# Patient Record
Sex: FEMALE | Race: WHITE | NOT HISPANIC OR LATINO | URBAN - METROPOLITAN AREA
[De-identification: names, ages, dates, MRNs, and addresses within clinical notes are randomized per-mention and may not be internally consistent; named-entity substitution may affect disease eponyms.]

---
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
## 2023-02-01 ENCOUNTER — EMERGENCY (EMERGENCY)
Facility: HOSPITAL | Age: 18
LOS: 1 days | Discharge: HOME | End: 2023-02-01
Admitting: EMERGENCY MEDICINE
Payer: COMMERCIAL

## 2023-02-01 VITALS
DIASTOLIC BLOOD PRESSURE: 86 MMHG | OXYGEN SATURATION: 99 % | SYSTOLIC BLOOD PRESSURE: 130 MMHG | RESPIRATION RATE: 16 BRPM | TEMPERATURE: 99 F | HEART RATE: 109 BPM

## 2023-02-01 VITALS
SYSTOLIC BLOOD PRESSURE: 152 MMHG | HEIGHT: 65 IN | DIASTOLIC BLOOD PRESSURE: 96 MMHG | RESPIRATION RATE: 16 BRPM | TEMPERATURE: 98 F | HEART RATE: 128 BPM | OXYGEN SATURATION: 95 % | WEIGHT: 136.69 LBS

## 2023-02-01 DIAGNOSIS — R07.89 OTHER CHEST PAIN: ICD-10-CM

## 2023-02-01 DIAGNOSIS — Z88.8 ALLERGY STATUS TO OTHER DRUGS, MEDICAMENTS AND BIOLOGICAL SUBSTANCES: ICD-10-CM

## 2023-02-01 DIAGNOSIS — F41.9 ANXIETY DISORDER, UNSPECIFIED: ICD-10-CM

## 2023-02-01 DIAGNOSIS — F14.90 COCAINE USE, UNSPECIFIED, UNCOMPLICATED: ICD-10-CM

## 2023-02-01 DIAGNOSIS — F32.A DEPRESSION, UNSPECIFIED: ICD-10-CM

## 2023-02-01 DIAGNOSIS — R00.2 PALPITATIONS: ICD-10-CM

## 2023-02-01 LAB
ALBUMIN SERPL ELPH-MCNC: 4.5 G/DL — SIGNIFICANT CHANGE UP (ref 3.4–5)
ALP SERPL-CCNC: 66 U/L — SIGNIFICANT CHANGE UP (ref 40–120)
ALT FLD-CCNC: 22 U/L — SIGNIFICANT CHANGE UP (ref 12–42)
ANION GAP SERPL CALC-SCNC: 9 MMOL/L — SIGNIFICANT CHANGE UP (ref 9–16)
AST SERPL-CCNC: 12 U/L — LOW (ref 15–37)
BASOPHILS # BLD AUTO: 0.09 K/UL — SIGNIFICANT CHANGE UP (ref 0–0.2)
BASOPHILS NFR BLD AUTO: 0.7 % — SIGNIFICANT CHANGE UP (ref 0–2)
BILIRUB SERPL-MCNC: 0.6 MG/DL — SIGNIFICANT CHANGE UP (ref 0.2–1.2)
BUN SERPL-MCNC: 13 MG/DL — SIGNIFICANT CHANGE UP (ref 7–23)
CALCIUM SERPL-MCNC: 9.8 MG/DL — SIGNIFICANT CHANGE UP (ref 8.5–10.5)
CHLORIDE SERPL-SCNC: 102 MMOL/L — SIGNIFICANT CHANGE UP (ref 96–108)
CO2 SERPL-SCNC: 28 MMOL/L — SIGNIFICANT CHANGE UP (ref 22–31)
CREAT SERPL-MCNC: 0.88 MG/DL — SIGNIFICANT CHANGE UP (ref 0.5–1.3)
EGFR: 97 ML/MIN/1.73M2 — SIGNIFICANT CHANGE UP
EOSINOPHIL # BLD AUTO: 0.22 K/UL — SIGNIFICANT CHANGE UP (ref 0–0.5)
EOSINOPHIL NFR BLD AUTO: 1.7 % — SIGNIFICANT CHANGE UP (ref 0–6)
GLUCOSE SERPL-MCNC: 117 MG/DL — HIGH (ref 70–99)
HCT VFR BLD CALC: 39.9 % — SIGNIFICANT CHANGE UP (ref 34.5–45)
HGB BLD-MCNC: 13.2 G/DL — SIGNIFICANT CHANGE UP (ref 11.5–15.5)
IMM GRANULOCYTES NFR BLD AUTO: 0.3 % — SIGNIFICANT CHANGE UP (ref 0–0.9)
LYMPHOCYTES # BLD AUTO: 1.41 K/UL — SIGNIFICANT CHANGE UP (ref 1–3.3)
LYMPHOCYTES # BLD AUTO: 11.2 % — LOW (ref 13–44)
MAGNESIUM SERPL-MCNC: 1.9 MG/DL — SIGNIFICANT CHANGE UP (ref 1.6–2.6)
MCHC RBC-ENTMCNC: 29.2 PG — SIGNIFICANT CHANGE UP (ref 27–34)
MCHC RBC-ENTMCNC: 33.1 GM/DL — SIGNIFICANT CHANGE UP (ref 32–36)
MCV RBC AUTO: 88.3 FL — SIGNIFICANT CHANGE UP (ref 80–100)
MONOCYTES # BLD AUTO: 0.55 K/UL — SIGNIFICANT CHANGE UP (ref 0–0.9)
MONOCYTES NFR BLD AUTO: 4.4 % — SIGNIFICANT CHANGE UP (ref 2–14)
NEUTROPHILS # BLD AUTO: 10.27 K/UL — HIGH (ref 1.8–7.4)
NEUTROPHILS NFR BLD AUTO: 81.7 % — HIGH (ref 43–77)
NRBC # BLD: 0 /100 WBCS — SIGNIFICANT CHANGE UP (ref 0–0)
PLATELET # BLD AUTO: 339 K/UL — SIGNIFICANT CHANGE UP (ref 150–400)
POTASSIUM SERPL-MCNC: 4.3 MMOL/L — SIGNIFICANT CHANGE UP (ref 3.5–5.3)
POTASSIUM SERPL-SCNC: 4.3 MMOL/L — SIGNIFICANT CHANGE UP (ref 3.5–5.3)
PROT SERPL-MCNC: 8.2 G/DL — SIGNIFICANT CHANGE UP (ref 6.4–8.2)
RBC # BLD: 4.52 M/UL — SIGNIFICANT CHANGE UP (ref 3.8–5.2)
RBC # FLD: 12.2 % — SIGNIFICANT CHANGE UP (ref 10.3–14.5)
SODIUM SERPL-SCNC: 139 MMOL/L — SIGNIFICANT CHANGE UP (ref 132–145)
TROPONIN I, HIGH SENSITIVITY RESULT: 5 NG/L — SIGNIFICANT CHANGE UP
WBC # BLD: 12.58 K/UL — HIGH (ref 3.8–10.5)
WBC # FLD AUTO: 12.58 K/UL — HIGH (ref 3.8–10.5)

## 2023-02-01 PROCEDURE — 99285 EMERGENCY DEPT VISIT HI MDM: CPT

## 2023-02-01 PROCEDURE — 71045 X-RAY EXAM CHEST 1 VIEW: CPT | Mod: 26

## 2023-02-01 RX ORDER — SODIUM CHLORIDE 9 MG/ML
1000 INJECTION INTRAMUSCULAR; INTRAVENOUS; SUBCUTANEOUS ONCE
Refills: 0 | Status: COMPLETED | OUTPATIENT
Start: 2023-02-01 | End: 2023-02-01

## 2023-02-01 RX ADMIN — SODIUM CHLORIDE 1000 MILLILITER(S): 9 INJECTION INTRAMUSCULAR; INTRAVENOUS; SUBCUTANEOUS at 14:56

## 2023-02-01 NOTE — ED ADULT NURSE NOTE - OBJECTIVE STATEMENT
reports taking "unknown" substance prior to class - as per EMS she had reported cocaine use earlier this morning. Had gone to [facility] who sent pt to ED for further evaluation as pt c/o heart racing, visual hallucinations - states she sees bugs and bright lights. Reports substance use in the past but not daily. Reports headache.

## 2023-02-01 NOTE — ED ADULT TRIAGE NOTE - CHIEF COMPLAINT QUOTE
patient BIBA from clinic s/p cocaine use this morning at 9am; took 0.2g of cocaine (2 bumps); felt eyes were moving all around and heart racing; sent by urgent care

## 2023-02-01 NOTE — ED ADULT NURSE REASSESSMENT NOTE - NS ED NURSE REASSESS COMMENT FT1
pt more alert, stating she feels better and is no longer having visual hallucinations. Requesting to go home.

## 2023-02-01 NOTE — ED PROVIDER NOTE - PHYSICAL EXAMINATION
CONSTITUTIONAL: Well-appearing; well-nourished; in no apparent distress.   	HEAD: Normocephalic; atraumatic.   	EYES:  conjunctiva and sclera clear  	ENT: normal nose; no rhinorrhea; normal pharynx with no erythema or lesions.   	NECK: Supple; non-tender;   	CARDIOVASCULAR: Normal S1, S2; no murmurs, rubs, or gallops. Regular rate and rhythm.   	RESPIRATORY: Breathing easily; breath sounds clear and equal bilaterally; no wheezes, rhonchi, or rales.  	GI: Soft; non-distended; non-tender  	EXT: BOB x 4.   	SKIN: Normal for age and race; warm; dry; good turgor; no apparent lesions or rash.   	NEURO: A & O x 3; face symmetric; grossly unremarkable.   PSYCHOLOGICAL: The patient’s mood and manner are appropriate.

## 2023-02-01 NOTE — ED PROVIDER NOTE - NSFOLLOWUPINSTRUCTIONS_ED_ALL_ED_FT
Please go to detox to get help  Using illicit substance is dangerous    Follow up with your primary care doctor or clinics listed below if you do not have a doctor  [address] [address]  To make an appointment, call [phone]  [facility]  Address: [address] [place] [id number]  Appointment Center: [phone] ([phone])     Return for any concerning or worsening symptoms.

## 2023-02-01 NOTE — ED PROVIDER NOTE - IV ALTEPLASE EXCL ABS HIDDEN
Action Requested: Summary for Provider     []  Critical Lab, Recommendations Needed  [x] Need Additional Advice  []   FYI    []   Need Orders  [] Need Medications Sent to Pharmacy  []  Other     SUMMARY: Spoke with wife Harjinder Flaherty and states patient relayed t
F/u Monday  May double book
LMTCB
LMTCB  Transfer to triage
See MD message below. LMTCB to offer appt. Tasked to phone room to assist with scheduling appt with Dr [facility] on Nov 6th for depression.  May double book per md.
Spoke to patient's wife - verified pt's  - wife states she informed pt that Dr [facility] wanted to see him today, pt stated to wife that he wanted to wait a few more days.  Wife states she does not want to put too much pressure on him, but that she won't let h
Spoke to pt's wife - verified pt's  and HIPAA - wife states she spoke to pt, he seems more upbeat today, pt said he feels a little rushed but that he will call soon.      Advised wife that she needs to not push too hard to make pt resentful but also not
Would still like to see patient today.
show

## 2023-02-01 NOTE — ED PROVIDER NOTE - OBJECTIVE STATEMENT
17-year-old female with history of depression, anxiety, polysubstance use, presents complaining like her heart is racing with associated chest tightness.  She said symptoms started shortly after using cocaine earlier this morning.  Denies shortness of breath.  Denies fever or chills.  No syncope.  No recent fall or signs of trauma.

## 2023-02-01 NOTE — ED PROVIDER NOTE - CLINICAL SUMMARY MEDICAL DECISION MAKING FREE TEXT BOX
heart racing sensation with chest tightness sensation in the setting of cocaine use earlier today, arrived tachycardic and appears under influence of cocaine, EKG sinus tach, labs unremarkable, improved vitals, received IV hydration. patient reported to staff that she wants to leave as she is feeling better and left without her discharge papers.

## 2023-02-01 NOTE — ED PROVIDER NOTE - PATIENT PORTAL LINK FT
You can access the FollowMyHealth Patient Portal offered by [facility] by registering at the following website: [email or web address]. By joining [employer]’s FollowMyHealth portal, you will also be able to view your health information using other applications (apps) compatible with our system.

## 2023-07-30 NOTE — ED BEHAVIORAL HEALTH ASSESSMENT NOTE - PREFERRED LANGUAGE
Chief Complaint   Patient presents with    Drug Ingestion     Pt took Diphenhydramine 25mg pills x3 tabs tonight to help her sleep and is now feeling very anxious; pt also endorses smoking marijuana tonight (which she does often)    Anxiety     Pt ambulatory to triage for above complaint. Pt appears very anxious and almost paranoid in triage but is still cooperative. Pt has hx of adverse reaction to benadryl (hallucinations) in the past but decided to take it tonight anyways to help her sleep. Pt denies trying to overdose or harm herself stating she was just trying to go to sleep tonight.     Pt is alert/oriented and follows commands. Pt speaking in full sentences and responds appropriately to questions. No acute distress noted in triage and respirations are even and unlabored.     Pt placed in lobby and educated on triage process. Pt encouraged to alert staff for any changes in condition.  
English
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
## 2025-05-10 ENCOUNTER — EMERGENCY (EMERGENCY)
Facility: HOSPITAL | Age: 20
LOS: 1 days | End: 2025-05-10
Attending: EMERGENCY MEDICINE | Admitting: EMERGENCY MEDICINE
Payer: COMMERCIAL

## 2025-05-10 VITALS
SYSTOLIC BLOOD PRESSURE: 119 MMHG | OXYGEN SATURATION: 100 % | RESPIRATION RATE: 16 BRPM | DIASTOLIC BLOOD PRESSURE: 87 MMHG | HEART RATE: 108 BPM | TEMPERATURE: 99 F

## 2025-05-10 DIAGNOSIS — F29 UNSPECIFIED PSYCHOSIS NOT DUE TO A SUBSTANCE OR KNOWN PHYSIOLOGICAL CONDITION: ICD-10-CM

## 2025-05-10 DIAGNOSIS — F19.10 OTHER PSYCHOACTIVE SUBSTANCE ABUSE, UNCOMPLICATED: ICD-10-CM

## 2025-05-10 DIAGNOSIS — R46.2 STRANGE AND INEXPLICABLE BEHAVIOR: ICD-10-CM

## 2025-05-10 DIAGNOSIS — F41.0 PANIC DISORDER [EPISODIC PAROXYSMAL ANXIETY]: ICD-10-CM

## 2025-05-10 DIAGNOSIS — Z88.8 ALLERGY STATUS TO OTHER DRUGS, MEDICAMENTS AND BIOLOGICAL SUBSTANCES: ICD-10-CM

## 2025-05-10 DIAGNOSIS — F84.0 AUTISTIC DISORDER: ICD-10-CM

## 2025-05-10 DIAGNOSIS — F60.3 BORDERLINE PERSONALITY DISORDER: ICD-10-CM

## 2025-05-10 DIAGNOSIS — Z91.52 PERSONAL HISTORY OF NONSUICIDAL SELF-HARM: ICD-10-CM

## 2025-05-10 LAB
ALBUMIN SERPL ELPH-MCNC: 4.4 G/DL — SIGNIFICANT CHANGE UP (ref 3.4–5)
ALP SERPL-CCNC: 73 U/L — SIGNIFICANT CHANGE UP (ref 40–120)
ALT FLD-CCNC: 26 U/L — SIGNIFICANT CHANGE UP (ref 12–42)
ANION GAP SERPL CALC-SCNC: 13 MMOL/L — SIGNIFICANT CHANGE UP (ref 9–16)
AST SERPL-CCNC: 57 U/L — HIGH (ref 15–37)
BASOPHILS # BLD AUTO: 0.11 K/UL — SIGNIFICANT CHANGE UP (ref 0–0.2)
BASOPHILS NFR BLD AUTO: 1 % — SIGNIFICANT CHANGE UP (ref 0–2)
BILIRUB SERPL-MCNC: 3.2 MG/DL — HIGH (ref 0.2–1.2)
BUN SERPL-MCNC: 9 MG/DL — SIGNIFICANT CHANGE UP (ref 7–23)
CALCIUM SERPL-MCNC: 10.8 MG/DL — HIGH (ref 8.5–10.5)
CHLORIDE SERPL-SCNC: 101 MMOL/L — SIGNIFICANT CHANGE UP (ref 96–108)
CO2 SERPL-SCNC: 23 MMOL/L — SIGNIFICANT CHANGE UP (ref 22–31)
CREAT SERPL-MCNC: 0.59 MG/DL — SIGNIFICANT CHANGE UP (ref 0.5–1.3)
EGFR: 131 ML/MIN/1.73M2 — SIGNIFICANT CHANGE UP
EGFR: 131 ML/MIN/1.73M2 — SIGNIFICANT CHANGE UP
EOSINOPHIL # BLD AUTO: 0.13 K/UL — SIGNIFICANT CHANGE UP (ref 0–0.5)
EOSINOPHIL NFR BLD AUTO: 1.2 % — SIGNIFICANT CHANGE UP (ref 0–6)
ETHANOL SERPL-MCNC: <3 MG/DL — SIGNIFICANT CHANGE UP
FLUAV AG NPH QL: SIGNIFICANT CHANGE UP
FLUBV AG NPH QL: SIGNIFICANT CHANGE UP
GLUCOSE SERPL-MCNC: 88 MG/DL — SIGNIFICANT CHANGE UP (ref 70–99)
HCG SERPL-ACNC: <1 MIU/ML — SIGNIFICANT CHANGE UP
HCT VFR BLD CALC: 44.7 % — SIGNIFICANT CHANGE UP (ref 34.5–45)
HGB BLD-MCNC: 14.1 G/DL — SIGNIFICANT CHANGE UP (ref 11.5–15.5)
IMM GRANULOCYTES # BLD AUTO: 0.04 K/UL — SIGNIFICANT CHANGE UP (ref 0–0.07)
IMM GRANULOCYTES NFR BLD AUTO: 0.4 % — SIGNIFICANT CHANGE UP (ref 0–0.9)
LYMPHOCYTES # BLD AUTO: 2.42 K/UL — SIGNIFICANT CHANGE UP (ref 1–3.3)
LYMPHOCYTES NFR BLD AUTO: 22.7 % — SIGNIFICANT CHANGE UP (ref 13–44)
MAGNESIUM SERPL-MCNC: 2 MG/DL — SIGNIFICANT CHANGE UP (ref 1.6–2.6)
MCHC RBC-ENTMCNC: 28.5 PG — SIGNIFICANT CHANGE UP (ref 27–34)
MCHC RBC-ENTMCNC: 31.5 G/DL — LOW (ref 32–36)
MCV RBC AUTO: 90.5 FL — SIGNIFICANT CHANGE UP (ref 80–100)
MONOCYTES # BLD AUTO: 0.62 K/UL — SIGNIFICANT CHANGE UP (ref 0–0.9)
MONOCYTES NFR BLD AUTO: 5.8 % — SIGNIFICANT CHANGE UP (ref 2–14)
NEUTROPHILS # BLD AUTO: 7.33 K/UL — SIGNIFICANT CHANGE UP (ref 1.8–7.4)
NEUTROPHILS NFR BLD AUTO: 68.9 % — SIGNIFICANT CHANGE UP (ref 43–77)
NRBC # BLD AUTO: 0 K/UL — SIGNIFICANT CHANGE UP (ref 0–0)
NRBC # FLD: 0 K/UL — SIGNIFICANT CHANGE UP (ref 0–0)
NRBC BLD AUTO-RTO: 0 /100 WBCS — SIGNIFICANT CHANGE UP (ref 0–0)
PLATELET # BLD AUTO: 413 K/UL — HIGH (ref 150–400)
PMV BLD: 10.1 FL — SIGNIFICANT CHANGE UP (ref 7–13)
POTASSIUM SERPL-MCNC: 4.5 MMOL/L — SIGNIFICANT CHANGE UP (ref 3.5–5.3)
POTASSIUM SERPL-SCNC: 4.5 MMOL/L — SIGNIFICANT CHANGE UP (ref 3.5–5.3)
PROT SERPL-MCNC: 8.7 G/DL — HIGH (ref 6.4–8.2)
RBC # BLD: 4.94 M/UL — SIGNIFICANT CHANGE UP (ref 3.8–5.2)
RBC # FLD: 13.3 % — SIGNIFICANT CHANGE UP (ref 10.3–14.5)
RSV RNA NPH QL NAA+NON-PROBE: SIGNIFICANT CHANGE UP
SARS-COV-2 RNA SPEC QL NAA+PROBE: SIGNIFICANT CHANGE UP
SODIUM SERPL-SCNC: 137 MMOL/L — SIGNIFICANT CHANGE UP (ref 132–145)
SOURCE RESPIRATORY: SIGNIFICANT CHANGE UP
WBC # BLD: 10.65 K/UL — HIGH (ref 3.8–10.5)
WBC # FLD AUTO: 10.65 K/UL — HIGH (ref 3.8–10.5)

## 2025-05-10 PROCEDURE — 90792 PSYCH DIAG EVAL W/MED SRVCS: CPT | Mod: 95

## 2025-05-10 PROCEDURE — 99223 1ST HOSP IP/OBS HIGH 75: CPT

## 2025-05-10 RX ADMIN — Medication 2000 MILLILITER(S): at 18:03

## 2025-05-10 NOTE — ED BEHAVIORAL HEALTH ASSESSMENT NOTE - NS ED BHA DEMOGRAPHICS MEDICAL RECORD REVIEWED CONSENT OBTAINED YN
Pt reports nausea and vomiting x4 days. States she was started on a new BP med (unknown name) and that she has felt bad since that time. No

## 2025-05-10 NOTE — ED PROVIDER NOTE - CLINICAL SUMMARY MEDICAL DECISION MAKING FREE TEXT BOX
19-year-old female brought in by EMS for bizarre behavior at an [facility] library.  She admitted to drinking 2 alcoholic beverages to them, she denied this to me.  She appears grossly disorganized and psychotic, attempting to write down phone numbers in [place] but only writing gibberish.  She has dilated pupils with some rotatory nystagmus.  Upon chart review she appears to have a history of bipolar, schizophrenia, JOHN, autism, borderline personality disorder, cutting as well as cocaine, benzo and ketamine use.  I attempted to reach her mother but the patient's cell phone was dead and the charging port liquid did not and the phone would not charge.  She was placed on a one-to-one and screening labs for psych ordered.  Her alcohol level was ultimately negative.  Will place on observation.  Will obtain psychiatric evaluation... . I also sent a lithium level

## 2025-05-10 NOTE — ED CDU PROVIDER INITIAL DAY NOTE - CLINICAL SUMMARY MEDICAL DECISION MAKING FREE TEXT BOX
19-year-old female brought in by EMS for bizarre behavior at an [facility] library.  She admitted to drinking 2 alcoholic beverages to them, she denied this to me.  She appears grossly disorganized and psychotic, attempting to write down phone numbers in Kiswahili but only writing gibberish.  She has dilated pupils with some rotatory nystagmus.  Upon chart review she appears to have a history of bipolar, schizophrenia, JOHN, autism, borderline personality disorder, cutting as well as cocaine, benzo and ketamine use.  I attempted to reach her mother but the patient's cell phone was dead and the charging port liquid did not and the phone would not charge.  She was placed on a one-to-one and screening labs for psych ordered.  Her alcohol level was ultimately negative.  Will place on observation.  Will obtain psychiatric evaluation... . I also sent a lithium level

## 2025-05-10 NOTE — ED BEHAVIORAL HEALTH ASSESSMENT NOTE - PSYCHIATRIC ISSUES AND PLAN (INCLUDE STANDING AND PRN MEDICATION)
For acute agitation, can provide Haldol 5mg/ Ativan 2mg/ Benadryl 50mg q8 PRN, hold for sedation or qtc greater than 500
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
## 2025-05-10 NOTE — ED CDU PROVIDER INITIAL DAY NOTE - PROGRESS NOTE DETAILS
Psych to hold patient for reassessment d/t concern for substance use intoxication causing increased disorganization. Patient now lucid and states that she does not remember coming to the emergency department or how she got here.  Last thing that she recalls is being out with friends drinking.  She denies any known substance use.  Also denying any active SI/HI, AH/VH.  Telepsych called to update them so they can provide reassessment of the patient given her improved mental status. Psychiatry requested collateral information before they were able to make a disposition.  They are leaning towards admission given the fact that her lithium levels are undetectable.  I have given them the information the patient provided for the friend: Kristi Christian [phone] Patient is doing much better, she was cleared for discharge by psych.  Psychiatry ended up speaking to her friend Kristi as well as her mother Harjinder.  The patient apparently has a long history of substance use and a failed attempt at rehab last year.  Patient is speaking to her mother now before discharge.  I sent her mother a list of detox programs in the city.  Will discharge the patient.

## 2025-05-10 NOTE — ED ADULT NURSE NOTE - CHIEF COMPLAINT
The patient is a 19y Female complaining of AMS. Psych history, admits to drinking alcohol today. Oriented x3, asking repetetive questions. Need redirection.
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
## 2025-05-10 NOTE — ED ADULT NURSE NOTE - NSFALLRISKINTERV_ED_ALL_ED
Assistance OOB with selected safe patient handling equipment if applicable/Assistance with ambulation/Communicate fall risk and risk factors to all staff, patient, and family/Monitor gait and stability/Monitor for mental status changes and reorient to person, place, and time, as needed/Provide visual cue: yellow wristband, yellow gown, etc/Reinforce activity limits and safety measures with patient and family/Toileting schedule using arm’s reach rule for commode and bathroom/Use of alarms - bed, stretcher, chair and/or video monitoring/Call bell, personal items and telephone in reach/Instruct patient to call for assistance before getting out of bed/chair/stretcher/Non-slip footwear applied when patient is off stretcher/[place] to call system/Physically safe environment - no spills, clutter or unnecessary equipment/Purposeful Proactive Rounding/Room/bathroom lighting operational, light cord in reach
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
## 2025-05-10 NOTE — ED PROVIDER NOTE - PHYSICAL EXAMINATION
VITAL SIGNS: I have reviewed nursing notes and confirm.   CONST: Well-developed; well-nourished; Disorganized, tangential, grossly psychotic and or high. Urinated on herself  ENT: No nasal discharge; airway clear.  HEAD: Normocephalic; atraumatic.  EYES: Sclera clear. Pupils round and symmetrical bilaterally. 6 mm pupils b/l, + rotatory nystagmus  CARD: S1, S2 normal; no murmurs, gallops, or rubs. Regular rate and rhythm.  RESP: No wheezes, rales or rhonchi. Breathing comfortably; speaking in full sentences.   ABD: Soft; non-distended; non-tender; no rebound or guarding.  MSK: No acute deformities noted to extremities.   NEURO: Alert, oriented. Speech is fluent and appropriate. Moving all extremities appropriately. No gross motor or sensory abnormalities.   SKIN: Skin is normal temperature; no diaphoresis; no pallor.   PSYCH: Grossly disorganized, appears psychotic and/or high occasionally hitting herself

## 2025-05-10 NOTE — ED ADULT NURSE REASSESSMENT NOTE - NS ED NURSE REASSESS COMMENT FT1
Patient changed into a yellow gown and placed in a private room with 1:1 sitter. Belongings bagged and given to security.

## 2025-05-10 NOTE — ED BEHAVIORAL HEALTH ASSESSMENT NOTE - SUMMARY
18yo F, [employer] student,  no known past medical history, past psychiatric history of JOHN, MDD, ADHD, polysubstance use per chart (alcohol, nicotine, cocaine, ketamine), per 2022 chart note was seeing outpatient psychiatrist Dr. Butler and therapist Alverto Dumont (on gabapentin, adderall, abilify at the time), hx of cutting, hx prior IPP at [facility] in Dec 2021 who presents for erratic behavior at [employer] [facility]. Psych consulted for psychosis. No UDS result available at this time.     Patient currently presents disoriented with slurred and disorganized speech, concerning for substance induced psychosis (likely given history) vs. primary psychosis. Will hold for reassessment pending metabolizing of substances and collateral. No known collateral numbers at this time other than therapist.     Recommendations:  -Hold for reassessment pending metabolizing of substances  -Recommend UDS  -Pending further safety and dispo planning. Unclear if will need psych admission at this time 20yo F, [employer] student,  no known past medical history, past psychiatric history of JOHN, MDD, ADHD, polysubstance use per chart (alcohol, nicotine, cocaine, ketamine), per 2022 chart note was seeing outpatient psychiatrist Dr. Butler and therapist Alverto Dumont (on gabapentin, adderall, abilify at the time), hx of cutting, hx prior IPP at [facility] in Dec 2021 who presents for erratic behavior at [employer] [facility]. Psych consulted for psychosis. No UDS result available at this time.     Patient currently presents disoriented with slurred and disorganized speech, concerning for substance induced psychosis (likely given history) vs. primary psychosis. Will hold for reassessment pending metabolizing of substances and collateral. No known collateral numbers at this time other than past therapist.     Recommendations:    -Hold for reassessment pending metabolizing of substances    -Recommend UDS    -Pending further safety and dispo planning. Unclear if will need psych admission at this time

## 2025-05-10 NOTE — ED CDU PROVIDER INITIAL DAY NOTE - DETAILS
Patient placed on one-to-one as well as observation for suspected psychosis with likely superimposed substance use

## 2025-05-10 NOTE — ED BEHAVIORAL HEALTH ASSESSMENT NOTE - OTHER
unable to assess denies not known not known, at [facility] brought from [facility] slurred speech
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
## 2025-05-11 VITALS
RESPIRATION RATE: 16 BRPM | SYSTOLIC BLOOD PRESSURE: 107 MMHG | TEMPERATURE: 98 F | DIASTOLIC BLOOD PRESSURE: 73 MMHG | OXYGEN SATURATION: 98 % | HEART RATE: 75 BPM

## 2025-05-11 LAB — LITHIUM SERPL-MCNC: <0.2 MMOL/L — LOW (ref 0.6–1.2)

## 2025-05-11 PROCEDURE — 99213 OFFICE O/P EST LOW 20 MIN: CPT | Mod: 95

## 2025-05-11 PROCEDURE — 99238 HOSP IP/OBS DSCHRG MGMT 30/<: CPT

## 2025-05-11 RX ORDER — QUETIAPINE FUMARATE 25 MG/1
100 TABLET ORAL ONCE
Refills: 0 | Status: COMPLETED | OUTPATIENT
Start: 2025-05-11 | End: 2025-05-11

## 2025-05-11 RX ORDER — LITHIUM CARBONATE 600 MG/1
600 CAPSULE, GELATIN COATED ORAL ONCE
Refills: 0 | Status: COMPLETED | OUTPATIENT
Start: 2025-05-11 | End: 2025-05-11

## 2025-05-11 NOTE — ED BEHAVIORAL HEALTH PROGRESS NOTE - DETAILS
not clinically indicated given lack of suicidal ideation or depressive symptoms; safety planning regarding substance use communicated self

## 2025-05-11 NOTE — ED BEHAVIORAL HEALTH PROGRESS NOTE - SAFETY PLAN ADDT'L DETAILS
Safety plan discussed with.../Education provided regarding environmental safety / lethal means restriction/Provision of National Suicide Prevention Lifeline 5-700-089-UPVQ (7192)

## 2025-05-11 NOTE — ED CDU PROVIDER DISPOSITION NOTE - PATIENT PORTAL LINK FT
You can access the FollowMyHealth Patient Portal offered by [facility] by registering at the following website: [email or web address]. By joining [employer]’s FollowMyHealth portal, you will also be able to view your health information using other applications (apps) compatible with our system.

## 2025-05-11 NOTE — ED CDU PROVIDER DISPOSITION NOTE - CLINICAL COURSE
Patient initially presented in a state that resembled psychosis and intoxication.  She cleared overnight.  Her lithium level was negative.  Is likely that she has been noncompliant.  She is in contact with her mother who has set clear boundaries with her.  Will discharge with a list of outpatient mental health and substance use resources.  Patient has a long history of polysubstance use mental illness and possible personality disorder.  She [place] an apartment [place] the city.

## 2025-05-11 NOTE — ED BEHAVIORAL HEALTH PROGRESS NOTE - DETAILS:
Patient re-evaluated. Patient now linear, oriented to person, place, time. Reports remembering speaking with Dr. Marie; states she was very tired and confused at the time but has slept more and feels better. Reports and reiterates concern for finals being this week; also reports having a conversation with her mother this morning, and is worried her mother thinks she is using drugs. Patient denies using substances, but does endorse using extra "Z" pills at times. Expresses feeling safe in her apartment and with family; endorses a desire to do well in classes. Denies suicidal or homicidal ideation, intent, or plan; denies recent urge to self-harm
Patient re-evaluated when ED reported pt was ready for eval and requesting to go home to study for finals. Patient continues to have slurred speech, not fully oriented, stating it is April. Cannot recall previous evaluation stating she did not speak to Dr. Yu earlier. Patient states mood is good and that she is stressed that this all happened since she should have been studying or writing her essays. States this ED visit puts a damper on success of finals, states she has 1 essay due 5/12 and exams to sit for. Patient unable to provide list of courses she is taking currently without requiring extended time to think and correct herself. Could not recall names of professors. States errors and difficulty were from being very tired. Patient states she doesn't remember who she was with tonight. Asked for phone number of friends/family for collateral.

## 2025-05-11 NOTE — ED BEHAVIORAL HEALTH PROGRESS NOTE - RISK ASSESSMENT
RF: current psychotic symptoms, hx substance use  PF: pending further assessment
RF: current psychotic symptoms, hx substance use  PF: help-seeking, future-oriented, no access to weapons.

## 2025-05-11 NOTE — ED ADULT NURSE REASSESSMENT NOTE - NS ED NURSE REASSESS COMMENT FT1
Per ED provider, spoke with patients mom and cleared for discharge. Patient A&Ox4, ambulating independently. Provided psychiatry resources. Denies questions. Patient calling own uber for dc. Belongings returned.

## 2025-05-11 NOTE — ED BEHAVIORAL HEALTH PROGRESS NOTE - SUMMARY
Since previous assessment, pt has become more irritable and restless, asking to go home to study and prepare for finals.  No PRN medication provided.
Since previous assessment, pt has become calm, sleep some.  No PRN medication provided.

## 2025-05-11 NOTE — ED BEHAVIORAL HEALTH PROGRESS NOTE - CASE SUMMARY/FORMULATION (CLEARLY DOCUMENT RATIONALE FOR DISPOSITION CHANGE)
18yo F, [employer] student,  no known past medical history, past psychiatric history of JOHN, MDD, ADHD, polysubstance use per chart (alcohol, nicotine, cocaine, ketamine), per 2022 chart note was seeing outpatient psychiatrist Dr. Butler and therapist Alverto Dumont (on gabapentin, adderall, abilify at the time), hx of cutting, hx prior IPP at [facility] in Dec 2021 who presents for erratic behavior at [employer] [facility]. Psych consulted for psychosis, re-eval after ED reporting pt is awake requesting to return home.     On reassessment, patient continues to present disoriented with slurred and disorganized speech, concerning for substance induced psychosis (likely given history) vs. primary psychosis. Since previous evaluation, UDS resulted + for benzo and amphetamines and Li <0.2. Will hold for reassessment pending metabolizing of substances and collateral. No known collateral numbers at this time other than past therapist and patient guarded when asked for contact info.   
20yo F, [employer] student,  no known past medical history, past psychiatric history of JOHN, MDD, ADHD, polysubstance use per chart (alcohol, nicotine, cocaine, ketamine), per 2022 chart note was seeing outpatient psychiatrist Dr. Butler and therapist Alverto Dumont (on gabapentin, adderall, abilify at the time), hx of cutting, hx prior IPP at [facility] in Dec 2021 who presents for erratic behavior at [employer] [facility]. Psych consulted for psychosis, re-eval after ED reporting pt is awake requesting to return home.     On reassessment, patient is now oriented, with improvement in organization and speech, making previous concerns for substance induced psychosis most likely given continuous improvement over three evaluations and ~15 hours of observation. Collateral does not express imminent safety concerns, and patient denies suicidal or homicidal ideation, intent, or plan, and expresses a desire to return home, declining voluntary psychiatric inpatient psychiatric hospitalization. Given the above, patient does not appear to meet involuntary criteria for hospitalization at this time. There is no psychiatric contraindication to discharge at this time.

## 2025-05-11 NOTE — ED CDU PROVIDER DISPOSITION NOTE - NSFOLLOWUPINSTRUCTIONS_ED_ALL_ED_FT
Please get help for alcohol abuse if you drink heavily or use drugs on a regular basis.     [facility] is a good referral line for crisis and substance abuse help.  [facility] has drop in programs all over the [facility].    Return to the ER for Emergencies.     [facility]: [phone]   [facility] Substance Abuse Services: [phone], option #2   Methadone Maintenance & Ambulatory Opiate Detox: [phone]  [facility]: [phone]  [facility]: [phone]  [facility]: [phone]    [facility]: [phone], option #2   [facility]: [phone]    [facility]: [phone]    [facility] Central Intake: [phone]  [facility] Chemical Dependency/Ancillary Withdrawal: [phone]  [facility] Methadone Maintenance: [phone]    [facility]: [phone]  [facility] Addiction Treatment Services: [phone]    [facility]: 0-303-8-[facility] Office of Alcoholism and Substance Abuse Services (OASAS): https://www.oasas.[place].gov/providerdirectory/  [facility] for Addiction Services and Psychotherapy Interventions Research (CASPIR)  www.[facility].org     Interested in discussing options to reduce your tobacco use?    [facility] for Tobacco Control:  [phone]  [facility] QUITLINE: 7-178-GV-QUITS ([phone])    Interested in learning more about substance use?      http://rethinkingdrinking.niaaa.nih.gov   https://www.drugabuse.gov/patients-families     Learn more about opioid overdose prevention programs in [place]:  http://www.health.[place].gov/diseases/aids/general/opioid_overdose_prevention/

## 2025-05-11 NOTE — ED BEHAVIORAL HEALTH PROGRESS NOTE - BILLING CODES
48430-Wxnhvteyxk hospital care - low complexity 20-29 minutes
39641-Wjwvhgbisf hospital care - low complexity 20-29 minutes

## 2025-05-11 NOTE — ED BEHAVIORAL HEALTH PROGRESS NOTE - NS ED BHA PLAN PSYCHIATRIC ISSUES2 FT
For acute agitation, can provide Haldol 5mg/ Ativan 2mg/ Benadryl 50mg q8 PRN, hold for sedation or qtc greater than 500.

## 2025-05-11 NOTE — ED BEHAVIORAL HEALTH PROGRESS NOTE - LACKING INFO FOR PSYCH DISPO DETAILS FREE TEXT
Patient held for substance metabolism. 
Patient held for substance metabolism. States she had ativan earlier today and alcohol. States alcohol may have been laced with something else.